# Patient Record
Sex: FEMALE | Race: OTHER | HISPANIC OR LATINO | URBAN - METROPOLITAN AREA
[De-identification: names, ages, dates, MRNs, and addresses within clinical notes are randomized per-mention and may not be internally consistent; named-entity substitution may affect disease eponyms.]

---

## 2023-12-11 ENCOUNTER — EMERGENCY (EMERGENCY)
Facility: HOSPITAL | Age: 43
LOS: 1 days | Discharge: HOME | End: 2023-12-11
Attending: STUDENT IN AN ORGANIZED HEALTH CARE EDUCATION/TRAINING PROGRAM | Admitting: STUDENT IN AN ORGANIZED HEALTH CARE EDUCATION/TRAINING PROGRAM
Payer: SELF-PAY

## 2023-12-11 VITALS
SYSTOLIC BLOOD PRESSURE: 120 MMHG | HEART RATE: 81 BPM | TEMPERATURE: 98 F | DIASTOLIC BLOOD PRESSURE: 80 MMHG | OXYGEN SATURATION: 98 % | RESPIRATION RATE: 18 BRPM | WEIGHT: 199.96 LBS | HEIGHT: 71 IN

## 2023-12-11 LAB
APPEARANCE UR: CLEAR — SIGNIFICANT CHANGE UP
APPEARANCE UR: CLEAR — SIGNIFICANT CHANGE UP
BILIRUB UR-MCNC: NEGATIVE — SIGNIFICANT CHANGE UP
BILIRUB UR-MCNC: NEGATIVE — SIGNIFICANT CHANGE UP
COLOR SPEC: YELLOW — SIGNIFICANT CHANGE UP
COLOR SPEC: YELLOW — SIGNIFICANT CHANGE UP
DIFF PNL FLD: NEGATIVE — SIGNIFICANT CHANGE UP
DIFF PNL FLD: NEGATIVE — SIGNIFICANT CHANGE UP
GLUCOSE UR QL: NEGATIVE MG/DL — SIGNIFICANT CHANGE UP
GLUCOSE UR QL: NEGATIVE MG/DL — SIGNIFICANT CHANGE UP
HCG UR QL: NEGATIVE — SIGNIFICANT CHANGE UP
HCG UR QL: NEGATIVE — SIGNIFICANT CHANGE UP
KETONES UR-MCNC: NEGATIVE MG/DL — SIGNIFICANT CHANGE UP
KETONES UR-MCNC: NEGATIVE MG/DL — SIGNIFICANT CHANGE UP
LEUKOCYTE ESTERASE UR-ACNC: NEGATIVE — SIGNIFICANT CHANGE UP
LEUKOCYTE ESTERASE UR-ACNC: NEGATIVE — SIGNIFICANT CHANGE UP
NITRITE UR-MCNC: NEGATIVE — SIGNIFICANT CHANGE UP
NITRITE UR-MCNC: NEGATIVE — SIGNIFICANT CHANGE UP
PH UR: 6 — SIGNIFICANT CHANGE UP (ref 5–8)
PH UR: 6 — SIGNIFICANT CHANGE UP (ref 5–8)
PROT UR-MCNC: NEGATIVE MG/DL — SIGNIFICANT CHANGE UP
PROT UR-MCNC: NEGATIVE MG/DL — SIGNIFICANT CHANGE UP
SP GR SPEC: 1.01 — SIGNIFICANT CHANGE UP (ref 1–1.03)
SP GR SPEC: 1.01 — SIGNIFICANT CHANGE UP (ref 1–1.03)
UROBILINOGEN FLD QL: 0.2 MG/DL — SIGNIFICANT CHANGE UP (ref 0.2–1)
UROBILINOGEN FLD QL: 0.2 MG/DL — SIGNIFICANT CHANGE UP (ref 0.2–1)

## 2023-12-11 PROCEDURE — 87801 DETECT AGNT MULT DNA AMPLI: CPT

## 2023-12-11 PROCEDURE — 81003 URINALYSIS AUTO W/O SCOPE: CPT

## 2023-12-11 PROCEDURE — 87661 TRICHOMONAS VAGINALIS AMPLIF: CPT

## 2023-12-11 PROCEDURE — 87798 DETECT AGENT NOS DNA AMP: CPT

## 2023-12-11 PROCEDURE — 99283 EMERGENCY DEPT VISIT LOW MDM: CPT | Mod: 25

## 2023-12-11 PROCEDURE — 81025 URINE PREGNANCY TEST: CPT

## 2023-12-11 PROCEDURE — 96372 THER/PROPH/DIAG INJ SC/IM: CPT

## 2023-12-11 PROCEDURE — 99284 EMERGENCY DEPT VISIT MOD MDM: CPT

## 2023-12-11 PROCEDURE — 87591 N.GONORRHOEAE DNA AMP PROB: CPT

## 2023-12-11 PROCEDURE — 87491 CHLMYD TRACH DNA AMP PROBE: CPT

## 2023-12-11 PROCEDURE — 87086 URINE CULTURE/COLONY COUNT: CPT

## 2023-12-11 RX ORDER — FLUCONAZOLE 150 MG/1
150 TABLET ORAL ONCE
Refills: 0 | Status: COMPLETED | OUTPATIENT
Start: 2023-12-11 | End: 2023-12-11

## 2023-12-11 RX ORDER — CEFTRIAXONE 500 MG/1
500 INJECTION, POWDER, FOR SOLUTION INTRAMUSCULAR; INTRAVENOUS ONCE
Refills: 0 | Status: COMPLETED | OUTPATIENT
Start: 2023-12-11 | End: 2023-12-11

## 2023-12-11 RX ADMIN — Medication 100 MILLIGRAM(S): at 14:08

## 2023-12-11 RX ADMIN — FLUCONAZOLE 150 MILLIGRAM(S): 150 TABLET ORAL at 14:08

## 2023-12-11 RX ADMIN — CEFTRIAXONE 500 MILLIGRAM(S): 500 INJECTION, POWDER, FOR SOLUTION INTRAMUSCULAR; INTRAVENOUS at 14:09

## 2023-12-11 NOTE — ED PROVIDER NOTE - CARE PROVIDER_API CALL
Percy Farfan.  Obstetrics and Gynecology  [address] [place] [id number]  Phone: [phone]  Fax: [phone]  Follow Up Time:    Percy Farfan.  Obstetrics and Gynecology  [address] [place] [id number]  Phone: [phone]  Fax: [phone]  Follow Up Time:

## 2023-12-11 NOTE — ED PROVIDER NOTE - OBJECTIVE STATEMENT
42 yo F presents to ED c/o thick white vaginal discharge, burning vulvovaginal pain off and on x 3 weeks. States she was treated for confirmed BV twice with Metrogel over the last 3 weeks. Symptoms have not improved. As per pt, most recent STD panel negative ~2 weeks ago. Pt states she is currently sexually active with 3 male partners, unprotected intercourse.  Pt denies abd pain, N/V/D, vaginal bleeding, back pain, urinary symptoms, fever/chills. 44 yo F presents to ED c/o thick white vaginal discharge, burning vulvovaginal pain off and on x 3 weeks. States she was treated for confirmed BV twice with Metrogel over the last 3 weeks. Symptoms have not improved. As per pt, most recent STD panel negative ~2 weeks ago. Pt states she is currently sexually active with 3 male partners, unprotected intercourse.  Pt denies abd pain, N/V/D, vaginal bleeding, back pain, urinary symptoms, fever/chills.

## 2023-12-11 NOTE — ED PROVIDER NOTE - PATIENT PORTAL LINK FT
You can access the FollowMyHealth Patient Portal offered by [facility] by registering at the following website: [email or web address] By joining [employer]’s FollowMyHealth portal, you will also be able to view your health information using other applications (apps) compatible with our system. You can access the FollowMyHealth Patient Portal offered by [facility] by registering at the following website: [email or web address]. By joining [employer]’s FollowMyHealth portal, you will also be able to view your health information using other applications (apps) compatible with our system.

## 2023-12-11 NOTE — ED PROVIDER NOTE - NSFOLLOWUPINSTRUCTIONS_ED_ALL_ED_FT
Clindamycin 300 mg twice daIly (BV)  Doxycycline 100 mg twice daily (empiric treatment)  Follow up with GYN in 1-2 days.  Return to the ED immediately for new or worsening symptoms as we discussed.      English    Vaginitis    Vaginitis is a condition in which the vaginal tissue swells and becomes irritated. This condition is most often caused by a change in the normal balance of bacteria and yeast that live in the vagina. This change causes an overgrowth of certain bacteria or yeast, which causes the inflammation. There are different types of vaginitis.    What are the causes?  The cause of this condition depends on the type of vaginitis. It can be caused by:  Bacteria (bacterial vaginosis).  Yeast, which is a fungus (candidiasis).  A parasite (trichomoniasis vaginitis).  A virus (viral vaginitis).  Low hormone levels (atrophic vaginitis). Low hormone levels can occur during pregnancy, breastfeeding, or after menopause.  Irritants, such as bubble baths, scented tampons, and feminine sprays (allergic vaginitis).  Other factors can change the normal balance of the yeast and bacteria that live in the vagina. These include:  Antibiotic medicines.  Poor hygiene.  Diaphragms, vaginal sponges, spermicides, birth control pills, and intrauterine devices (IUDs).  Sex.  Infection.  Uncontrolled diabetes.  A weakened body defense system (immune system).  What increases the risk?  This condition is more likely to develop in women who:  Smoke or are exposed to secondhand smoke.  Use vaginal douches, scented tampons, or scented sanitary pads.  Wear tight-fitting pants or thong underwear.  Use oral birth control pills or an IUD.  Have sex without a condom or have multiple partners.  Have an STI.  Frequently use the spermicide nonoxynol-9.  Eat lots of foods high in sugar or who have uncontrolled diabetes.  Have low estrogen levels.  Have a weakened immune system from an immune disorder or medical treatment.  Are pregnant or breastfeeding.  What are the signs or symptoms?  Symptoms vary depending on the cause of the vaginitis. Common symptoms include:  Abnormal vaginal discharge.  The discharge is white, gray, or yellow with bacterial vaginosis.  The discharge is thick, white, and cheesy with a yeast infection.  The discharge is frothy and yellow or greenish with trichomoniasis.  A bad vaginal smell. The smell is fishy with bacterial vaginosis.  Vaginal itching, pain, or swelling.  Pain with sex.  Pain or burning when urinating.  Sometimes there are no symptoms.    How is this diagnosed?  This condition is diagnosed based on your symptoms and medical history. A physical exam, including a pelvic exam, will also be done. You may also have other tests, including:  Tests to determine the pH level (acidity or alkalinity) of your vagina.  A whiff test to assess the odor that results when a sample of your vaginal discharge is mixed with a potassium hydroxide solution.  Tests of vaginal fluid. A sample will be examined under a microscope.  How is this treated?  Treatment varies depending on the type of vaginitis you have. Your treatment may include:  Antibiotic creams or pills to treat bacterial vaginosis and trichomoniasis.  Antifungal medicines, such as vaginal creams or suppositories, to treat a yeast infection.  Medicine to ease discomfort if you have viral vaginitis. Your sexual partner should also be treated.  Estrogen delivered in a cream, pill, suppository, or vaginal ring to treat atrophic vaginitis. If vaginal dryness occurs, lubricants and moisturizing creams may help. You may need to avoid scented soaps, sprays, or douches.  Stopping use of a product that is causing allergic vaginitis and then using a vaginal cream to treat the symptoms.  Follow these instructions at home:  Lifestyle    Keep your genital area clean and dry. Avoid soap, and only rinse the area with water.  Do not douche or use tampons until your health care provider says it is okay. Use sanitary pads, if needed.  Do not have sex until your health care provider approves. When you can return to sex, practice safe sex and use condoms.  Wipe from front to back. This avoids the spread of bacteria from the rectum to the vagina.  General instructions    Take over-the-counter and prescription medicines only as told by your health care provider.  If you were prescribed an antibiotic medicine, take or use it as told by your health care provider. Do not stop taking or using the antibiotic even if you start to feel better.  Keep all follow-up visits. This is important.  How is this prevented?  Use mild, unscented products. Do not use things that can irritate the vagina, such as fabric softeners. Avoid the following products if they are scented:  Feminine sprays.  Detergents.  Tampons.  Feminine hygiene products.  Soaps or bubble baths.  Let air reach your genital area. To do this:  Wear cotton underwear to reduce moisture buildup.  Avoid wearing underwear while you sleep.  Avoid wearing tight pants and underwear or nylons without a cotton panel.  Avoid wearing thong underwear.  Take off any wet clothing, such as bathing suits, as soon as possible.  Practice safe sex and use condoms.  Contact a health care provider if:  You have abdominal or pelvic pain.  You have a fever or chills.  You have symptoms that last for more than 2–3 days.  Get help right away if:  You have a fever and your symptoms suddenly get worse.  Summary  Vaginitis is a condition in which the vaginal tissue becomes inflamed.This condition is most often caused by a change in the normal balance of bacteria and yeast that live in the vagina.  Treatment varies depending on the type of vaginitis you have.  Do not douche, use tampons, or have sex until your health care provider approves. When you can return to sex, practice safe sex and use condoms.  This information is not intended to replace advice given to you by your health care provider. Make sure you discuss any questions you have with your health care provider.    Document Revised: 06/17/2021 Document Reviewed: 06/17/2021  [employer] Patient Education © 2023 [employer]  [employer] logo  Terms and Conditions  Privacy Policy  Editorial Policy  All content on this site: Copyright © 2023 Elsevier, its licensors, and contributors. All rights are reserved, including those for text and data mining, AI training, and similar technologies. For all open access content, the Creative Commons licensing terms apply.  Cookies are used by this site. To decline or learn more, visit our Cookies page.  [employer] Clindamycin 300 mg twice daIly (BV)  Doxycycline 100 mg twice daily (empiric treatment)  Follow up with GYN in 1-2 days.  Return to the ED immediately for new or worsening symptoms as we discussed.      English    Vaginitis    Vaginitis is a condition in which the vaginal tissue swells and becomes irritated. This condition is most often caused by a change in the normal balance of bacteria and yeast that live in the vagina. This change causes an overgrowth of certain bacteria or yeast, which causes the inflammation. There are different types of vaginitis.    What are the causes?  The cause of this condition depends on the type of vaginitis. It can be caused by:  Bacteria (bacterial vaginosis).  Yeast, which is a fungus (candidiasis).  A parasite (trichomoniasis vaginitis).  A virus (viral vaginitis).  Low hormone levels (atrophic vaginitis). Low hormone levels can occur during pregnancy, breastfeeding, or after menopause.  Irritants, such as bubble baths, scented tampons, and feminine sprays (allergic vaginitis).  Other factors can change the normal balance of the yeast and bacteria that live in the vagina. These include:  Antibiotic medicines.  Poor hygiene.  Diaphragms, vaginal sponges, spermicides, birth control pills, and intrauterine devices (IUDs).  Sex.  Infection.  Uncontrolled diabetes.  A weakened body defense system (immune system).  What increases the risk?  This condition is more likely to develop in women who:  Smoke or are exposed to secondhand smoke.  Use vaginal douches, scented tampons, or scented sanitary pads.  Wear tight-fitting pants or thong underwear.  Use oral birth control pills or an IUD.  Have sex without a condom or have multiple partners.  Have an STI.  Frequently use the spermicide nonoxynol-9.  Eat lots of foods high in sugar or who have uncontrolled diabetes.  Have low estrogen levels.  Have a weakened immune system from an immune disorder or medical treatment.  Are pregnant or breastfeeding.  What are the signs or symptoms?  Symptoms vary depending on the cause of the vaginitis. Common symptoms include:  Abnormal vaginal discharge.  The discharge is white, gray, or yellow with bacterial vaginosis.  The discharge is thick, white, and cheesy with a yeast infection.  The discharge is frothy and yellow or greenish with trichomoniasis.  A bad vaginal smell. The smell is fishy with bacterial vaginosis.  Vaginal itching, pain, or swelling.  Pain with sex.  Pain or burning when urinating.  Sometimes there are no symptoms.    How is this diagnosed?  This condition is diagnosed based on your symptoms and medical history. A physical exam, including a pelvic exam, will also be done. You may also have other tests, including:  Tests to determine the pH level (acidity or alkalinity) of your vagina.  A whiff test to assess the odor that results when a sample of your vaginal discharge is mixed with a potassium hydroxide solution.  Tests of vaginal fluid. A sample will be examined under a microscope.  How is this treated?  Treatment varies depending on the type of vaginitis you have. Your treatment may include:  Antibiotic creams or pills to treat bacterial vaginosis and trichomoniasis.  Antifungal medicines, such as vaginal creams or suppositories, to treat a yeast infection.  Medicine to ease discomfort if you have viral vaginitis. Your sexual partner should also be treated.  Estrogen delivered in a cream, pill, suppository, or vaginal ring to treat atrophic vaginitis. If vaginal dryness occurs, lubricants and moisturizing creams may help. You may need to avoid scented soaps, sprays, or douches.  Stopping use of a product that is causing allergic vaginitis and then using a vaginal cream to treat the symptoms.  Follow these instructions at home:  Lifestyle    Keep your genital area clean and dry. Avoid soap, and only rinse the area with water.  Do not douche or use tampons until your health care provider says it is okay. Use sanitary pads, if needed.  Do not have sex until your health care provider approves. When you can return to sex, practice safe sex and use condoms.  Wipe from front to back. This avoids the spread of bacteria from the rectum to the vagina.  General instructions    Take over-the-counter and prescription medicines only as told by your health care provider.  If you were prescribed an antibiotic medicine, take or use it as told by your health care provider. Do not stop taking or using the antibiotic even if you start to feel better.  Keep all follow-up visits. This is important.  How is this prevented?  Use mild, unscented products. Do not use things that can irritate the vagina, such as fabric softeners. Avoid the following products if they are scented:  Feminine sprays.  Detergents.  Tampons.  Feminine hygiene products.  Soaps or bubble baths.  Let air reach your genital area. To do this:  Wear cotton underwear to reduce moisture buildup.  Avoid wearing underwear while you sleep.  Avoid wearing tight pants and underwear or nylons without a cotton panel.  Avoid wearing thong underwear.  Take off any wet clothing, such as bathing suits, as soon as possible.  Practice safe sex and use condoms.  Contact a health care provider if:  You have abdominal or pelvic pain.  You have a fever or chills.  You have symptoms that last for more than 2–3 days.  Get help right away if:  You have a fever and your symptoms suddenly get worse.  Summary  Vaginitis is a condition in which the vaginal tissue becomes inflamed.This condition is most often caused by a change in the normal balance of bacteria and yeast that live in the vagina.  Treatment varies depending on the type of vaginitis you have.  Do not douche, use tampons, or have sex until your health care provider approves. When you can return to sex, practice safe sex and use condoms.  This information is not intended to replace advice given to you by your health care provider. Make sure you discuss any questions you have with your health care provider.    Document Revised: 06/17/2021 Document Reviewed: 06/17/2021  [employer] Patient Education © 2023 [employer]  [employer] logo  Terms and Conditions  Privacy Policy  Editorial Policy  All content on this site: Copyright © 2023 Elsevier, its licensors, and contributors. All rights are reserved, including those for text and data mining, AI training, and similar technologies. For all open access content, the Creative Commons licensing terms apply.  Cookies are used by this site. To decline or learn more, visit our Cookies page.  [employer]

## 2023-12-11 NOTE — ED PROVIDER NOTE - PROGRESS NOTE DETAILS
high risk sexual behavior, pt only wants GC/C testing/empiric tx. declines hiv testing. plans to fu with her gyn when she returns home to [place]. Pt was given an opportunity to have all questions answered to satisfaction.  Pt verbalizes agreement and understanding of plan and ED return precautions. Pt well appearing, stable for DC home. No emergent concerns at this time.

## 2023-12-11 NOTE — ED PROVIDER NOTE - ATTENDING APP SHARED VISIT CONTRIBUTION OF CARE
Selvin Herrera MD (Attending Physician):    I performed a history and physical exam of the patient and discussed their management with the HANDY. I have reviewed the HANDY note and agree with the documented findings and plan of care, except as noted. This was a shared visit with an HANDY. I reviewed and verified the documentation and independently performed my own history/exam/medical decision making. My medical decision making and observations are found below. Please refer to any progress notes for updates on clinical course.    HPI:  44 yo F presents to ED c/o thick white vaginal discharge, burning vulvovaginal pain off and on x 3 weeks. States she was treated for confirmed BV twice with Metrogel over the last 3 weeks. Symptoms have not improved. As per pt, most recent STD panel negative ~2 weeks ago. Pt states she is currently sexually active with 3 male partners, unprotected intercourse.  Pt denies abd pain, N/V/D, vaginal bleeding, back pain, urinary symptoms, fever/chills.    PE:  GEN - NAD, well appearing, A&Ox3  HEAD - NC/AT  EYES - PERRL, EOMI  ENT - Airway patent, mucous membranes moist  PULMONARY - CTA b/l, symmetric breath sounds, no W/R/R  CARDIAC - +S1S2, RRR, no M/G/R, no JVD  ABDOMEN - +BS, ND, NT, soft, no guarding, no rebound, no masses, no rigidity   - No CVA TTP b/l. Pelvic exam (Chaperone: Ann Miles RN): Cervical os closed. No vaginal bleeding. No adnexal TTP b/l. +Thick, clumpy white discharge.  EXTREMITIES - FROM, symmetric pulses, no edema  SKIN - No rash or bruising  PSYCH - Normal mood/affect, normal insight    MDM:  DDx includes, but not limited to: infectious vaginitis due to candida, chlamydia, gonorrhea, bacterial vaginosis. Will also evaluate for UTI. Pt wants to be empirically treated before results come back. Labs, urine, abx, discharge home with abx prescriptions and outpatient f/u.

## 2023-12-11 NOTE — ED PROVIDER NOTE - NS ED ATTENDING STATEMENT MOD
This was a shared visit with the HANDY. I reviewed and verified the documentation and independently performed the documented:

## 2023-12-12 LAB
C TRACH RRNA SPEC QL NAA+PROBE: SIGNIFICANT CHANGE UP
C TRACH RRNA SPEC QL NAA+PROBE: SIGNIFICANT CHANGE UP
CULTURE RESULTS: SIGNIFICANT CHANGE UP
CULTURE RESULTS: SIGNIFICANT CHANGE UP
N GONORRHOEA RRNA SPEC QL NAA+PROBE: SIGNIFICANT CHANGE UP
N GONORRHOEA RRNA SPEC QL NAA+PROBE: SIGNIFICANT CHANGE UP
SPECIMEN SOURCE: SIGNIFICANT CHANGE UP
SPECIMEN SOURCE: SIGNIFICANT CHANGE UP

## 2023-12-15 LAB
A VAGINAE DNA VAG QL NAA+PROBE: SIGNIFICANT CHANGE UP
A VAGINAE DNA VAG QL NAA+PROBE: SIGNIFICANT CHANGE UP
BVAB2 DNA VAG QL NAA+PROBE: SIGNIFICANT CHANGE UP
BVAB2 DNA VAG QL NAA+PROBE: SIGNIFICANT CHANGE UP
C ALBICANS DNA VAG QL NAA+PROBE: POSITIVE
C ALBICANS DNA VAG QL NAA+PROBE: POSITIVE
C GLABRATA DNA VAG QL NAA+PROBE: NEGATIVE — SIGNIFICANT CHANGE UP
C GLABRATA DNA VAG QL NAA+PROBE: NEGATIVE — SIGNIFICANT CHANGE UP
C KRUSEI DNA VAG QL NAA+PROBE: NEGATIVE — SIGNIFICANT CHANGE UP
C KRUSEI DNA VAG QL NAA+PROBE: NEGATIVE — SIGNIFICANT CHANGE UP
C LUSITANIAE DNA VAG QL NAA+PROBE: NEGATIVE — SIGNIFICANT CHANGE UP
C LUSITANIAE DNA VAG QL NAA+PROBE: NEGATIVE — SIGNIFICANT CHANGE UP
C TRACH RRNA SPEC QL NAA+PROBE: NEGATIVE — SIGNIFICANT CHANGE UP
C TRACH RRNA SPEC QL NAA+PROBE: NEGATIVE — SIGNIFICANT CHANGE UP
CANDIDA DNA VAG QL NAA+PROBE: NEGATIVE — SIGNIFICANT CHANGE UP
CANDIDA DNA VAG QL NAA+PROBE: NEGATIVE — SIGNIFICANT CHANGE UP
MEGA1 DNA VAG QL NAA+PROBE: SIGNIFICANT CHANGE UP
MEGA1 DNA VAG QL NAA+PROBE: SIGNIFICANT CHANGE UP
N GONORRHOEA RRNA SPEC QL NAA+PROBE: NEGATIVE — SIGNIFICANT CHANGE UP
N GONORRHOEA RRNA SPEC QL NAA+PROBE: NEGATIVE — SIGNIFICANT CHANGE UP
T VAGINALIS RRNA SPEC QL NAA+PROBE: NEGATIVE — SIGNIFICANT CHANGE UP
T VAGINALIS RRNA SPEC QL NAA+PROBE: NEGATIVE — SIGNIFICANT CHANGE UP

## 2023-12-18 RX ORDER — FLUCONAZOLE 150 MG/1
1 TABLET ORAL
Qty: 1 | Refills: 0
Start: 2023-12-18 | End: 2023-12-18